# Patient Record
Sex: MALE | Race: WHITE | NOT HISPANIC OR LATINO
[De-identification: names, ages, dates, MRNs, and addresses within clinical notes are randomized per-mention and may not be internally consistent; named-entity substitution may affect disease eponyms.]

---

## 2017-02-14 ENCOUNTER — APPOINTMENT (OUTPATIENT)
Dept: GASTROENTEROLOGY | Facility: CLINIC | Age: 82
End: 2017-02-14

## 2017-02-14 VITALS
OXYGEN SATURATION: 98 % | RESPIRATION RATE: 16 BRPM | HEART RATE: 79 BPM | WEIGHT: 143 LBS | DIASTOLIC BLOOD PRESSURE: 85 MMHG | HEIGHT: 70 IN | TEMPERATURE: 97.5 F | SYSTOLIC BLOOD PRESSURE: 151 MMHG | BODY MASS INDEX: 20.47 KG/M2

## 2017-03-15 ENCOUNTER — APPOINTMENT (OUTPATIENT)
Dept: MRI IMAGING | Facility: IMAGING CENTER | Age: 82
End: 2017-03-15

## 2017-04-22 ENCOUNTER — OUTPATIENT (OUTPATIENT)
Dept: OUTPATIENT SERVICES | Facility: HOSPITAL | Age: 82
LOS: 1 days | End: 2017-04-22
Payer: MEDICARE

## 2017-04-22 ENCOUNTER — APPOINTMENT (OUTPATIENT)
Dept: MRI IMAGING | Facility: IMAGING CENTER | Age: 82
End: 2017-04-22

## 2017-04-22 DIAGNOSIS — D49.0 NEOPLASM OF UNSPECIFIED BEHAVIOR OF DIGESTIVE SYSTEM: ICD-10-CM

## 2017-04-22 DIAGNOSIS — Z96.649 PRESENCE OF UNSPECIFIED ARTIFICIAL HIP JOINT: Chronic | ICD-10-CM

## 2017-04-22 PROCEDURE — 82565 ASSAY OF CREATININE: CPT

## 2017-04-22 PROCEDURE — 74183 MRI ABD W/O CNTR FLWD CNTR: CPT

## 2017-04-22 PROCEDURE — A9585: CPT

## 2017-05-03 ENCOUNTER — MESSAGE (OUTPATIENT)
Age: 82
End: 2017-05-03

## 2017-06-05 ENCOUNTER — APPOINTMENT (OUTPATIENT)
Dept: UROLOGY | Facility: CLINIC | Age: 82
End: 2017-06-05

## 2017-06-05 VITALS
RESPIRATION RATE: 16 BRPM | SYSTOLIC BLOOD PRESSURE: 132 MMHG | HEIGHT: 70 IN | TEMPERATURE: 98.3 F | BODY MASS INDEX: 19.76 KG/M2 | HEART RATE: 85 BPM | WEIGHT: 138 LBS | DIASTOLIC BLOOD PRESSURE: 86 MMHG

## 2017-06-05 RX ORDER — FINASTERIDE 5 MG/1
5 TABLET, FILM COATED ORAL
Refills: 0 | Status: COMPLETED | COMMUNITY

## 2017-06-09 LAB
APPEARANCE: CLEAR
BACTERIA UR CULT: ABNORMAL
BACTERIA: NEGATIVE
BILIRUBIN URINE: NEGATIVE
BLOOD URINE: ABNORMAL
COLOR: YELLOW
GLUCOSE QUALITATIVE U: NORMAL MG/DL
HYALINE CASTS: 2 /LPF
KETONES URINE: NEGATIVE
LEUKOCYTE ESTERASE URINE: ABNORMAL
MICROSCOPIC-UA: NORMAL
NITRITE URINE: POSITIVE
PH URINE: 5.5
PROTEIN URINE: 30 MG/DL
RED BLOOD CELLS URINE: 3 /HPF
SPECIFIC GRAVITY URINE: 1.02
SQUAMOUS EPITHELIAL CELLS: 1 /HPF
URINE COMMENTS: NORMAL
UROBILINOGEN URINE: NORMAL MG/DL
WHITE BLOOD CELLS URINE: 38 /HPF

## 2017-07-06 ENCOUNTER — APPOINTMENT (OUTPATIENT)
Dept: UROLOGY | Facility: CLINIC | Age: 82
End: 2017-07-06

## 2017-10-02 ENCOUNTER — APPOINTMENT (OUTPATIENT)
Dept: UROLOGY | Facility: CLINIC | Age: 82
End: 2017-10-02
Payer: MEDICARE

## 2017-10-02 VITALS
TEMPERATURE: 97.3 F | HEART RATE: 75 BPM | WEIGHT: 136.38 LBS | RESPIRATION RATE: 16 BRPM | SYSTOLIC BLOOD PRESSURE: 130 MMHG | HEIGHT: 70 IN | BODY MASS INDEX: 19.52 KG/M2 | DIASTOLIC BLOOD PRESSURE: 76 MMHG

## 2017-10-02 PROCEDURE — 99214 OFFICE O/P EST MOD 30 MIN: CPT

## 2017-10-03 RX ORDER — OXYBUTYNIN CHLORIDE 5 MG/1
5 TABLET ORAL AT BEDTIME
Qty: 30 | Refills: 3 | Status: COMPLETED | COMMUNITY
Start: 2017-06-05 | End: 2017-10-03

## 2017-10-03 RX ORDER — SULFAMETHOXAZOLE AND TRIMETHOPRIM 800; 160 MG/1; MG/1
800-160 TABLET ORAL
Qty: 14 | Refills: 0 | Status: COMPLETED | COMMUNITY
Start: 2017-06-08 | End: 2017-10-03

## 2017-10-04 RX ORDER — TOLTERODINE TARTRATE 2 MG/1
2 CAPSULE, EXTENDED RELEASE ORAL DAILY
Qty: 90 | Refills: 3 | Status: DISCONTINUED | COMMUNITY
Start: 2017-10-04 | End: 2017-10-04

## 2017-10-05 LAB
APPEARANCE: CLEAR
BACTERIA UR CULT: ABNORMAL
BACTERIA: ABNORMAL
BILIRUBIN URINE: NEGATIVE
BLOOD URINE: ABNORMAL
COLOR: YELLOW
GLUCOSE QUALITATIVE U: NORMAL MG/DL
HYALINE CASTS: 0 /LPF
KETONES URINE: NEGATIVE
LEUKOCYTE ESTERASE URINE: ABNORMAL
MICROSCOPIC-UA: NORMAL
NITRITE URINE: POSITIVE
PH URINE: 5.5
PROTEIN URINE: ABNORMAL MG/DL
RED BLOOD CELLS URINE: 16 /HPF
SPECIFIC GRAVITY URINE: 1.02
SQUAMOUS EPITHELIAL CELLS: 4 /HPF
URINE COMMENTS: NORMAL
UROBILINOGEN URINE: NORMAL MG/DL
WHITE BLOOD CELLS URINE: 34 /HPF

## 2017-10-13 ENCOUNTER — OUTPATIENT (OUTPATIENT)
Dept: OUTPATIENT SERVICES | Facility: HOSPITAL | Age: 82
LOS: 1 days | End: 2017-10-13
Payer: MEDICARE

## 2017-10-13 ENCOUNTER — APPOINTMENT (OUTPATIENT)
Dept: ULTRASOUND IMAGING | Facility: HOSPITAL | Age: 82
End: 2017-10-13
Payer: MEDICARE

## 2017-10-13 DIAGNOSIS — N39.0 URINARY TRACT INFECTION, SITE NOT SPECIFIED: ICD-10-CM

## 2017-10-13 DIAGNOSIS — Z96.649 PRESENCE OF UNSPECIFIED ARTIFICIAL HIP JOINT: Chronic | ICD-10-CM

## 2017-10-13 DIAGNOSIS — R39.9 UNSPECIFIED SYMPTOMS AND SIGNS INVOLVING THE GENITOURINARY SYSTEM: ICD-10-CM

## 2017-10-13 PROCEDURE — 76775 US EXAM ABDO BACK WALL LIM: CPT

## 2017-10-13 PROCEDURE — 76857 US EXAM PELVIC LIMITED: CPT

## 2017-10-13 PROCEDURE — 76770 US EXAM ABDO BACK WALL COMP: CPT | Mod: 26

## 2017-10-26 ENCOUNTER — APPOINTMENT (OUTPATIENT)
Dept: UROLOGY | Facility: CLINIC | Age: 82
End: 2017-10-26
Payer: MEDICARE

## 2017-10-26 VITALS
HEIGHT: 70 IN | WEIGHT: 140 LBS | SYSTOLIC BLOOD PRESSURE: 118 MMHG | HEART RATE: 70 BPM | BODY MASS INDEX: 20.04 KG/M2 | DIASTOLIC BLOOD PRESSURE: 78 MMHG | TEMPERATURE: 97.9 F | RESPIRATION RATE: 16 BRPM | OXYGEN SATURATION: 97 %

## 2017-10-26 PROCEDURE — 99215 OFFICE O/P EST HI 40 MIN: CPT

## 2017-10-26 PROCEDURE — 51798 US URINE CAPACITY MEASURE: CPT

## 2017-10-26 RX ORDER — SOLIFENACIN SUCCINATE 5 MG/1
5 TABLET, FILM COATED ORAL
Qty: 90 | Refills: 3 | Status: DISCONTINUED | COMMUNITY
Start: 2017-10-03 | End: 2017-10-26

## 2017-11-06 ENCOUNTER — APPOINTMENT (OUTPATIENT)
Dept: UROLOGY | Facility: CLINIC | Age: 82
End: 2017-11-06
Payer: MEDICARE

## 2017-11-06 VITALS — HEART RATE: 86 BPM | DIASTOLIC BLOOD PRESSURE: 90 MMHG | TEMPERATURE: 97.8 F | SYSTOLIC BLOOD PRESSURE: 110 MMHG

## 2017-11-06 VITALS
WEIGHT: 135 LBS | DIASTOLIC BLOOD PRESSURE: 90 MMHG | HEIGHT: 70 IN | HEART RATE: 84 BPM | RESPIRATION RATE: 16 BRPM | SYSTOLIC BLOOD PRESSURE: 110 MMHG | BODY MASS INDEX: 19.33 KG/M2 | TEMPERATURE: 97.6 F

## 2017-11-06 DIAGNOSIS — N39.0 URINARY TRACT INFECTION, SITE NOT SPECIFIED: ICD-10-CM

## 2017-11-06 DIAGNOSIS — K59.00 CONSTIPATION, UNSPECIFIED: ICD-10-CM

## 2017-11-06 PROCEDURE — 52000 CYSTOURETHROSCOPY: CPT

## 2017-11-06 RX ORDER — FLUCONAZOLE 200 MG/1
200 TABLET ORAL DAILY
Qty: 7 | Refills: 0 | Status: COMPLETED | COMMUNITY
Start: 2017-10-26 | End: 2017-11-06

## 2017-11-06 RX ORDER — SULFAMETHOXAZOLE AND TRIMETHOPRIM 800; 160 MG/1; MG/1
800-160 TABLET ORAL
Qty: 6 | Refills: 0 | Status: COMPLETED | COMMUNITY
Start: 2017-10-26 | End: 2017-11-06

## 2017-11-06 RX ORDER — CIPROFLOXACIN HYDROCHLORIDE 500 MG/1
500 TABLET, FILM COATED ORAL
Refills: 0 | Status: COMPLETED | OUTPATIENT
Start: 2017-11-06

## 2017-11-06 RX ORDER — SULFAMETHOXAZOLE AND TRIMETHOPRIM 800; 160 MG/1; MG/1
800-160 TABLET ORAL
Qty: 14 | Refills: 0 | Status: COMPLETED | COMMUNITY
Start: 2017-10-05 | End: 2017-11-06

## 2017-11-06 RX ADMIN — CIPROFLOXACIN HYDROCHLORIDE 0 MG: 500 TABLET, FILM COATED ORAL at 00:00

## 2017-11-10 LAB
APPEARANCE: CLEAR
BACTERIA UR CULT: ABNORMAL
BACTERIA: ABNORMAL
BILIRUBIN URINE: NEGATIVE
BLOOD URINE: ABNORMAL
COLOR: YELLOW
GLUCOSE QUALITATIVE U: NEGATIVE MG/DL
HYALINE CASTS: 0 /LPF
KETONES URINE: NEGATIVE
LEUKOCYTE ESTERASE URINE: ABNORMAL
MICROSCOPIC-UA: NORMAL
NITRITE URINE: NEGATIVE
PH URINE: 5.5
PROTEIN URINE: NEGATIVE MG/DL
RED BLOOD CELLS URINE: 6 /HPF
SPECIFIC GRAVITY URINE: 1.01
SQUAMOUS EPITHELIAL CELLS: 0 /HPF
UROBILINOGEN URINE: NEGATIVE MG/DL
WHITE BLOOD CELLS URINE: 44 /HPF

## 2018-02-12 ENCOUNTER — APPOINTMENT (OUTPATIENT)
Dept: UROLOGY | Facility: CLINIC | Age: 83
End: 2018-02-12

## 2018-03-05 ENCOUNTER — APPOINTMENT (OUTPATIENT)
Dept: UROLOGY | Facility: CLINIC | Age: 83
End: 2018-03-05
Payer: MEDICARE

## 2018-03-05 DIAGNOSIS — R35.1 NOCTURIA: ICD-10-CM

## 2018-03-05 PROCEDURE — 51798 US URINE CAPACITY MEASURE: CPT

## 2018-03-05 PROCEDURE — 99213 OFFICE O/P EST LOW 20 MIN: CPT

## 2018-03-08 RX ORDER — ESCITALOPRAM OXALATE 10 MG/1
10 TABLET ORAL
Qty: 30 | Refills: 0 | Status: COMPLETED | COMMUNITY
Start: 2017-06-15

## 2018-03-08 RX ORDER — CIPROFLOXACIN HYDROCHLORIDE 500 MG/1
500 TABLET, FILM COATED ORAL
Qty: 2 | Refills: 0 | Status: COMPLETED | COMMUNITY
Start: 2017-11-06 | End: 2018-03-08

## 2018-03-08 RX ORDER — CLINDAMYCIN HYDROCHLORIDE 300 MG/1
300 CAPSULE ORAL EVERY 6 HOURS
Qty: 20 | Refills: 0 | Status: COMPLETED | COMMUNITY
Start: 2017-11-10 | End: 2018-03-08

## 2018-05-15 ENCOUNTER — APPOINTMENT (OUTPATIENT)
Dept: ORTHOPEDIC SURGERY | Facility: CLINIC | Age: 83
End: 2018-05-15

## 2018-06-28 ENCOUNTER — APPOINTMENT (OUTPATIENT)
Dept: GASTROENTEROLOGY | Facility: CLINIC | Age: 83
End: 2018-06-28

## 2018-09-17 ENCOUNTER — APPOINTMENT (OUTPATIENT)
Dept: UROLOGY | Facility: CLINIC | Age: 83
End: 2018-09-17

## 2020-08-05 ENCOUNTER — APPOINTMENT (OUTPATIENT)
Dept: PEDIATRIC MEDICAL GENETICS | Facility: CLINIC | Age: 85
End: 2020-08-05
Payer: MEDICARE

## 2020-08-05 PROCEDURE — ZZZZZ: CPT

## 2020-08-24 NOTE — REASON FOR VISIT
[Initial - Scheduled] : [unfilled]  is being seen for  ~M an initial scheduled visit [Home] : at home, [unfilled] , at the time of the visit. [Medical Office: ([facility])___] : at the medical office located in  [Verbal consent obtained from patient] : the patient, [unfilled] [FreeTextEntry3] : He is being seen due to a family history of a  pathogenic variant in PMP22.\par \par

## 2020-10-05 ENCOUNTER — APPOINTMENT (OUTPATIENT)
Dept: UROLOGY | Facility: CLINIC | Age: 85
End: 2020-10-05
Payer: MEDICARE

## 2020-10-08 ENCOUNTER — APPOINTMENT (OUTPATIENT)
Dept: UROLOGY | Facility: CLINIC | Age: 85
End: 2020-10-08
Payer: MEDICARE

## 2020-10-08 VITALS
HEIGHT: 70 IN | DIASTOLIC BLOOD PRESSURE: 85 MMHG | SYSTOLIC BLOOD PRESSURE: 147 MMHG | HEART RATE: 69 BPM | BODY MASS INDEX: 19.33 KG/M2 | WEIGHT: 135 LBS | TEMPERATURE: 98 F

## 2020-10-08 PROCEDURE — 99214 OFFICE O/P EST MOD 30 MIN: CPT

## 2020-10-11 RX ORDER — DOCUSATE SODIUM 100 MG/1
100 CAPSULE ORAL TWICE DAILY
Qty: 60 | Refills: 3 | Status: COMPLETED | COMMUNITY
Start: 2017-10-26 | End: 2020-10-11

## 2020-10-11 RX ORDER — UBIDECARENONE/VIT E ACET 100MG-5
50 MCG CAPSULE ORAL
Refills: 0 | Status: COMPLETED | COMMUNITY
End: 2020-10-11

## 2020-10-11 RX ORDER — TAMSULOSIN HYDROCHLORIDE 0.4 MG/1
0.4 CAPSULE ORAL
Qty: 90 | Refills: 3 | Status: COMPLETED | COMMUNITY
Start: 2017-10-03 | End: 2020-10-11

## 2020-10-11 RX ORDER — FINASTERIDE 5 MG/1
5 TABLET, FILM COATED ORAL
Qty: 90 | Refills: 3 | Status: COMPLETED | COMMUNITY
Start: 2017-11-06 | End: 2020-10-11

## 2020-10-11 RX ORDER — PNV NO.95/FERROUS FUM/FOLIC AC 28MG-0.8MG
TABLET ORAL
Refills: 0 | Status: COMPLETED | COMMUNITY
End: 2020-10-11

## 2020-10-11 RX ORDER — EUCALYPTUS OIL/MENTHOL/CAMPHOR 1.2%-4.8%
1000 OINTMENT (GRAM) TOPICAL
Refills: 0 | Status: COMPLETED | COMMUNITY
End: 2020-10-11

## 2020-10-11 NOTE — HISTORY OF PRESENT ILLNESS
[FreeTextEntry1] : 80 yo M presents with a many year history of nocturia that has progressively gotten worse - q1-2 hours to the point where he is having difficulty getting sleep. Does have history of urinary retention after a colonoscopy in 2015 due to BPH and underwent TURP by outside urologist. No change in symptoms after surgery. Has also been taking finasteride for many years. Denies any urinary straining, incomplete voiding, urgency, incontinence, gross hematuria, dysuria. Normal bowel movements. Most bothersome is his nocturia. Per family member, patient snores and son is worried about possible sleep apnea. \par \par Pt was put on trial of vesicare but also had recent hip dislocation and ended up going into urinary retention. Stopped all anticholinergics. LUTS has not improved at all. Has continued to refuse to go for sleep study. \par \par 10/8/20 interval history: Pt hasn't been seen since March, 2018\par Per pt, he had been doing well from urinary standpoint.\par Still having some nocturia but not as bothersome\par Currently on no medications\par However, noticed some hematuria a few weeks ago - lasted 1 day\par no dysuria, no fever, chills\par no history of UTI recently\par Drinks 2-3 glasses of water, sometimes milk\par Last PCP check-up was in Dec, per son, PSA was normal at that time\par

## 2020-10-11 NOTE — ASSESSMENT
[FreeTextEntry1] : 84 yo M with history of LUTS, recent gross hematuria\par \par - PVR = 60ml\par - UA, culture, cytology\par - Discussed possible etiologies for hematuria including benign (UTI, nephrolithiasis, cyst, BPH) vs malignancy (renal, ureteral and bladder). Discussed hematuria workup which includes CT urogram and cystoscopy. Discussed risk and benefits of cystoscopy.\par - Will hold off on cysto until CT urogram evaluated\par

## 2020-10-11 NOTE — PHYSICAL EXAM
[General Appearance - Well Developed] : well developed [General Appearance - Well Nourished] : well nourished [Well Groomed] : well groomed [General Appearance - In No Acute Distress] : no acute distress [Abdomen Soft] : soft [Abdomen Tenderness] : non-tender [Costovertebral Angle Tenderness] : no ~M costovertebral angle tenderness [Urethral Meatus] : meatus normal [Penis Abnormality] : normal uncircumcised penis [Urinary Bladder Findings] : the bladder was normal on palpation [Scrotum] : the scrotum was normal [Epididymis] : the epididymides were normal [Testes Tenderness] : no tenderness of the testes [Testes Mass (___cm)] : there were no testicular masses [Anus Abnormality] : the anus and perineum were normal [Rectal Exam - Rectum] : digital rectal exam was normal [Prostate Tenderness] : the prostate was not tender [No Prostate Nodules] : no prostate nodules [Prostate Size ___ gm] : prostate size [unfilled] gm [Edema] : no peripheral edema [] : no respiratory distress [Respiration, Rhythm And Depth] : normal respiratory rhythm and effort [Exaggerated Use Of Accessory Muscles For Inspiration] : no accessory muscle use [Oriented To Time, Place, And Person] : oriented to person, place, and time [Affect] : the affect was normal [Mood] : the mood was normal [Not Anxious] : not anxious [No Focal Deficits] : no focal deficits [No Palpable Adenopathy] : no palpable adenopathy [FreeTextEntry1] : left leg in brace

## 2020-10-13 LAB
ALBUMIN SERPL ELPH-MCNC: 4.3 G/DL
ALP BLD-CCNC: 94 U/L
ALT SERPL-CCNC: 17 U/L
ANION GAP SERPL CALC-SCNC: 13 MMOL/L
APPEARANCE: ABNORMAL
AST SERPL-CCNC: 19 U/L
BACTERIA UR CULT: ABNORMAL
BACTERIA: ABNORMAL
BASOPHILS # BLD AUTO: 0.03 K/UL
BASOPHILS NFR BLD AUTO: 0.4 %
BILIRUB SERPL-MCNC: 0.4 MG/DL
BILIRUBIN URINE: NEGATIVE
BLOOD URINE: NEGATIVE
BUN SERPL-MCNC: 26 MG/DL
CALCIUM SERPL-MCNC: 10.5 MG/DL
CHLORIDE SERPL-SCNC: 104 MMOL/L
CO2 SERPL-SCNC: 23 MMOL/L
COLOR: YELLOW
CREAT SERPL-MCNC: 0.91 MG/DL
EOSINOPHIL # BLD AUTO: 0.12 K/UL
EOSINOPHIL NFR BLD AUTO: 1.5 %
GLUCOSE QUALITATIVE U: NEGATIVE
GLUCOSE SERPL-MCNC: 103 MG/DL
HCT VFR BLD CALC: 46.2 %
HGB BLD-MCNC: 14.9 G/DL
HYALINE CASTS: 2 /LPF
IMM GRANULOCYTES NFR BLD AUTO: 0.2 %
KETONES URINE: NEGATIVE
LEUKOCYTE ESTERASE URINE: ABNORMAL
LYMPHOCYTES # BLD AUTO: 1.21 K/UL
LYMPHOCYTES NFR BLD AUTO: 14.9 %
MAN DIFF?: NORMAL
MCHC RBC-ENTMCNC: 30.7 PG
MCHC RBC-ENTMCNC: 32.3 GM/DL
MCV RBC AUTO: 95.1 FL
MICROSCOPIC-UA: NORMAL
MONOCYTES # BLD AUTO: 0.66 K/UL
MONOCYTES NFR BLD AUTO: 8.1 %
NEUTROPHILS # BLD AUTO: 6.07 K/UL
NEUTROPHILS NFR BLD AUTO: 74.9 %
NITRITE URINE: NEGATIVE
PH URINE: 6.5
PLATELET # BLD AUTO: 181 K/UL
POTASSIUM SERPL-SCNC: 4.6 MMOL/L
PROT SERPL-MCNC: 6.6 G/DL
PROTEIN URINE: ABNORMAL
RBC # BLD: 4.86 M/UL
RBC # FLD: 13.2 %
RED BLOOD CELLS URINE: 3 /HPF
SODIUM SERPL-SCNC: 139 MMOL/L
SPECIFIC GRAVITY URINE: 1.02
SQUAMOUS EPITHELIAL CELLS: 3 /HPF
UROBILINOGEN URINE: NORMAL
WBC # FLD AUTO: 8.11 K/UL
WHITE BLOOD CELLS URINE: 145 /HPF

## 2020-11-09 ENCOUNTER — APPOINTMENT (OUTPATIENT)
Dept: OTOLARYNGOLOGY | Facility: CLINIC | Age: 85
End: 2020-11-09
Payer: MEDICARE

## 2020-11-09 VITALS
TEMPERATURE: 98.4 F | HEIGHT: 70 IN | BODY MASS INDEX: 19.33 KG/M2 | WEIGHT: 135 LBS | DIASTOLIC BLOOD PRESSURE: 75 MMHG | SYSTOLIC BLOOD PRESSURE: 138 MMHG | HEART RATE: 59 BPM

## 2020-11-09 DIAGNOSIS — H90.3 SENSORINEURAL HEARING LOSS, BILATERAL: ICD-10-CM

## 2020-11-09 DIAGNOSIS — H61.23 IMPACTED CERUMEN, BILATERAL: ICD-10-CM

## 2020-11-09 PROCEDURE — 99204 OFFICE O/P NEW MOD 45 MIN: CPT | Mod: 25

## 2020-11-09 PROCEDURE — 69210 REMOVE IMPACTED EAR WAX UNI: CPT

## 2020-11-09 PROCEDURE — 99072 ADDL SUPL MATRL&STAF TM PHE: CPT

## 2020-11-09 NOTE — ASSESSMENT
[FreeTextEntry1] : Patient complaining of diminished hearing he is a  massive cerumen impaction 90% of it removed told his son who is with him to use to have him use Debrox to get rid of the rest and told him to [facility] for [facility] test and possible hearing aids since it is a covered benefit.

## 2020-11-09 NOTE — HISTORY OF PRESENT ILLNESS
[de-identified] : Patient has been having sudden worsening of hearing over the last month or so. He does not complain of pain or itching in the eras and has never worn hearing aids. He does not have any nasal complaints today such as congestion or runny nose. HE did go for an hear cleaning at another ENT many years ago and was advised he had a lot of accumulation in both ears.

## 2020-12-15 PROBLEM — N39.0 ACUTE LOWER UTI: Status: RESOLVED | Noted: 2017-06-08 | Resolved: 2020-12-15

## 2021-03-08 ENCOUNTER — TRANSCRIPTION ENCOUNTER (OUTPATIENT)
Age: 86
End: 2021-03-08

## 2021-03-08 ENCOUNTER — APPOINTMENT (OUTPATIENT)
Dept: UROLOGY | Facility: CLINIC | Age: 86
End: 2021-03-08
Payer: MEDICARE

## 2021-03-08 PROCEDURE — 99214 OFFICE O/P EST MOD 30 MIN: CPT

## 2021-03-08 PROCEDURE — 99072 ADDL SUPL MATRL&STAF TM PHE: CPT

## 2021-03-14 NOTE — PHYSICAL EXAM
[General Appearance - Well Developed] : well developed [General Appearance - Well Nourished] : well nourished [Well Groomed] : well groomed [General Appearance - In No Acute Distress] : no acute distress [Abdomen Soft] : soft [Abdomen Tenderness] : non-tender [Costovertebral Angle Tenderness] : no ~M costovertebral angle tenderness [Urethral Meatus] : meatus normal [Penis Abnormality] : normal uncircumcised penis [Urinary Bladder Findings] : the bladder was normal on palpation [Scrotum] : the scrotum was normal [Epididymis] : the epididymides were normal [Testes Tenderness] : no tenderness of the testes [Testes Mass (___cm)] : there were no testicular masses [Anus Abnormality] : the anus and perineum were normal [Rectal Exam - Rectum] : digital rectal exam was normal [Prostate Tenderness] : the prostate was not tender [No Prostate Nodules] : no prostate nodules [Prostate Size ___ gm] : prostate size [unfilled] gm [Edema] : no peripheral edema [] : no respiratory distress [Respiration, Rhythm And Depth] : normal respiratory rhythm and effort [Exaggerated Use Of Accessory Muscles For Inspiration] : no accessory muscle use [Oriented To Time, Place, And Person] : oriented to person, place, and time [Affect] : the affect was normal [Mood] : the mood was normal [Not Anxious] : not anxious [FreeTextEntry1] : left leg in brace [No Focal Deficits] : no focal deficits [No Palpable Adenopathy] : no palpable adenopathy

## 2021-03-14 NOTE — HISTORY OF PRESENT ILLNESS
[FreeTextEntry1] : 80 yo M presents with a many year history of nocturia that has progressively gotten worse - q1-2 hours to the point where he is having difficulty getting sleep. Does have history of urinary retention after a colonoscopy in 2015 due to BPH and underwent TURP by outside urologist. No change in symptoms after surgery. Has also been taking finasteride for many years. Denies any urinary straining, incomplete voiding, urgency, incontinence, gross hematuria, dysuria. Normal bowel movements. Most bothersome is his nocturia. Per family member, patient snores and son is worried about possible sleep apnea. \par \par Pt was put on trial of vesicare but also had recent hip dislocation and ended up going into urinary retention. Stopped all anticholinergics. LUTS has not improved at all. Has continued to refuse to go for sleep study. \par \par 10/8/20 interval history: Pt hasn't been seen since March, 2018\par Per pt, he had been doing well from urinary standpoint.\par Still having some nocturia but not as bothersome\par Currently on no medications\par However, noticed some hematuria a few weeks ago - lasted 1 day\par no dysuria, no fever, chills\par no history of UTI recently\par Drinks 2-3 glasses of water, sometimes milk\par Last PCP check-up was in Dec, per son, PSA was normal at that time\par \par 3/8/21 Interval history: gross hematuria yesterday - red urine\par currently yellow urine\par increase in LUTS overnight\par No fever, chills\par Never ended up getting his CT urogram after last visit

## 2021-03-14 NOTE — ASSESSMENT
[FreeTextEntry1] : 84 yo M with BPH, LUTS, gross hematuria\par \par - Reviewed previous UA which showed microhematuria, pyuria as well as culture which showed Group B Strep\par - UA, culture\par - Start Bactrim Rx\par - Given intermittent hematuria, emphasized importance of getting his CT urogram and ultimately may need cysto as well. Pt and son agreed that they would obtain CT urogram asap

## 2021-03-15 ENCOUNTER — NON-APPOINTMENT (OUTPATIENT)
Age: 86
End: 2021-03-15

## 2021-03-15 LAB
APPEARANCE: ABNORMAL
BACTERIA UR CULT: ABNORMAL
BACTERIA: ABNORMAL
BILIRUBIN URINE: NEGATIVE
BLOOD URINE: ABNORMAL
COLOR: YELLOW
GLUCOSE QUALITATIVE U: NEGATIVE
HYALINE CASTS: 0 /LPF
KETONES URINE: NEGATIVE
LEUKOCYTE ESTERASE URINE: ABNORMAL
MICROSCOPIC-UA: NORMAL
NITRITE URINE: NEGATIVE
PH URINE: 6
PROTEIN URINE: ABNORMAL
RED BLOOD CELLS URINE: 1 /HPF
SPECIFIC GRAVITY URINE: 1.01
SQUAMOUS EPITHELIAL CELLS: 1 /HPF
UROBILINOGEN URINE: NORMAL
WHITE BLOOD CELLS URINE: 102 /HPF

## 2021-03-23 ENCOUNTER — APPOINTMENT (OUTPATIENT)
Dept: GASTROENTEROLOGY | Facility: CLINIC | Age: 86
End: 2021-03-23
Payer: MEDICARE

## 2021-03-23 DIAGNOSIS — D49.0 NEOPLASM OF UNSPECIFIED BEHAVIOR OF DIGESTIVE SYSTEM: ICD-10-CM

## 2021-03-23 PROCEDURE — 99442: CPT

## 2021-03-28 NOTE — HISTORY OF PRESENT ILLNESS
[Home] : at home, [unfilled] , at the time of the visit. 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 His weight is stable. [Time Spent: ___ minutes] : I have spent [unfilled] minutes with the patient on the telephone

## 2021-03-28 NOTE — PLAN
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 Branch duct duct component measures 20 x 23 mm by April 2016 EUS/FNA, with no significant changes on October 2016 MRI.\par Family history of pancreatic cancer.\par \par Recommendations:\par \par Patient and son are interested in surveillance.\par Ordered laboratory tests as below.\par MRI/MRCP with and without contrast ordered.\par They will call for results.

## 2021-03-29 ENCOUNTER — APPOINTMENT (OUTPATIENT)
Dept: OTOLARYNGOLOGY | Facility: CLINIC | Age: 86
End: 2021-03-29

## 2021-03-31 ENCOUNTER — NON-APPOINTMENT (OUTPATIENT)
Age: 86
End: 2021-03-31

## 2021-09-13 ENCOUNTER — APPOINTMENT (OUTPATIENT)
Age: 86
End: 2021-09-13
Payer: MEDICARE

## 2021-09-13 PROCEDURE — 99214 OFFICE O/P EST MOD 30 MIN: CPT

## 2021-09-16 LAB
ANION GAP SERPL CALC-SCNC: 9 MMOL/L
BASOPHILS # BLD AUTO: 0.03 K/UL
BASOPHILS NFR BLD AUTO: 0.4 %
BUN SERPL-MCNC: 20 MG/DL
CALCIUM SERPL-MCNC: 10.4 MG/DL
CHLORIDE SERPL-SCNC: 106 MMOL/L
CO2 SERPL-SCNC: 27 MMOL/L
CREAT SERPL-MCNC: 0.94 MG/DL
EOSINOPHIL # BLD AUTO: 0.14 K/UL
EOSINOPHIL NFR BLD AUTO: 2.1 %
GLUCOSE SERPL-MCNC: 94 MG/DL
HCT VFR BLD CALC: 49.8 %
HGB BLD-MCNC: 15.5 G/DL
IMM GRANULOCYTES NFR BLD AUTO: 0.1 %
LYMPHOCYTES # BLD AUTO: 1.43 K/UL
LYMPHOCYTES NFR BLD AUTO: 21.1 %
MAN DIFF?: NORMAL
MCHC RBC-ENTMCNC: 29.9 PG
MCHC RBC-ENTMCNC: 31.1 GM/DL
MCV RBC AUTO: 96.1 FL
MONOCYTES # BLD AUTO: 0.61 K/UL
MONOCYTES NFR BLD AUTO: 9 %
NEUTROPHILS # BLD AUTO: 4.55 K/UL
NEUTROPHILS NFR BLD AUTO: 67.3 %
PLATELET # BLD AUTO: 156 K/UL
POTASSIUM SERPL-SCNC: 4.7 MMOL/L
RBC # BLD: 5.18 M/UL
RBC # FLD: 13.5 %
SODIUM SERPL-SCNC: 142 MMOL/L
WBC # FLD AUTO: 6.77 K/UL

## 2021-09-19 NOTE — HISTORY OF PRESENT ILLNESS
[FreeTextEntry1] : 82 yo M presents with a many year history of nocturia that has progressively gotten worse - q1-2 hours to the point where he is having difficulty getting sleep. Does have history of urinary retention after a colonoscopy in 2015 due to BPH and underwent TURP by outside urologist. No change in symptoms after surgery. Has also been taking finasteride for many years. Denies any urinary straining, incomplete voiding, urgency, incontinence, gross hematuria, dysuria. Normal bowel movements. Most bothersome is his nocturia. Per family member, patient snores and son is worried about possible sleep apnea. \par \par Pt was put on trial of vesicare but also had recent hip dislocation and ended up going into urinary retention. Stopped all anticholinergics. LUTS has not improved at all. Has continued to refuse to go for sleep study. \par \par 10/8/20 interval history: Pt hasn't been seen since March, 2018\par Per pt, he had been doing well from urinary standpoint.\par Still having some nocturia but not as bothersome\par Currently on no medications\par However, noticed some hematuria a few weeks ago - lasted 1 day\par no dysuria, no fever, chills\par no history of UTI recently\par Drinks 2-3 glasses of water, sometimes milk\par Last PCP check-up was in Dec, per son, PSA was normal at that time\par \par 3/8/21 Interval history: gross hematuria yesterday - red urine\par currently yellow urine\par increase in LUTS overnight\par No fever, chills\par Never ended up getting his CT urogram after last visit\par \par 9/13/21 Interval history: Doing well since last visit with no significant complaints\par CT done after last visit showed some bilateral nephroliths and simple renal cysts\par Also showed severe dilatation of the pancreatic duct which was previously evaluated by GI, per pt's son\par UA at last visit did show bacteruria with pyuria but no hematuria\par Still hasn't really established care with a PCP since his previous one passed away

## 2021-09-19 NOTE — ASSESSMENT
[FreeTextEntry1] : 84 yo M with BPH, LUTS, recurrent UTI, nephrolithiasis\par \par - Reviewed CT from March through [facility] portal and discussed findings with pt\par - Given stones are nonobstructing, will continue observation at this time\par - Discussed possible etiologies for nephrolithiasis. Reviewed behavioral modifications including adequate hydration, cutting back on coffee, dark sodas, dark teas, low sodium diet, increasing citrate levels with lemon juice. \par - Discussed importance of seeking medical attention should intractable flank pain with nausea, vomiting or fever occur\par - discussed possible etiologies for UTI. Spent extensive period of time discussed behavioral modification including adequate hydration, cutting back on caffeine intake, timed voiding during the day, importance of controlling any diabetes and chronic constipation and how all of these things could potentially increase risk for persistent or recurrent UTI.\par - Encouraged pt to establish care with a PCP. Will draw routine CBC and BMP today\par - FU in 6 months

## 2021-09-19 NOTE — PHYSICAL EXAM
[General Appearance - Well Nourished] : well nourished [General Appearance - Well Developed] : well developed [Well Groomed] : well groomed [General Appearance - In No Acute Distress] : no acute distress [Abdomen Tenderness] : non-tender [Abdomen Soft] : soft [Costovertebral Angle Tenderness] : no ~M costovertebral angle tenderness [Urethral Meatus] : meatus normal [Penis Abnormality] : normal uncircumcised penis [Urinary Bladder Findings] : the bladder was normal on palpation [Scrotum] : the scrotum was normal [Epididymis] : the epididymides were normal [Testes Tenderness] : no tenderness of the testes [Testes Mass (___cm)] : there were no testicular masses [Anus Abnormality] : the anus and perineum were normal [Rectal Exam - Rectum] : digital rectal exam was normal [Prostate Tenderness] : the prostate was not tender [No Prostate Nodules] : no prostate nodules [Prostate Size ___ gm] : prostate size [unfilled] gm [Edema] : no peripheral edema [] : no respiratory distress [Exaggerated Use Of Accessory Muscles For Inspiration] : no accessory muscle use [Respiration, Rhythm And Depth] : normal respiratory rhythm and effort [Oriented To Time, Place, And Person] : oriented to person, place, and time [Affect] : the affect was normal [Mood] : the mood was normal [FreeTextEntry1] : left leg in brace [Not Anxious] : not anxious [No Focal Deficits] : no focal deficits [No Palpable Adenopathy] : no palpable adenopathy

## 2022-02-07 ENCOUNTER — APPOINTMENT (OUTPATIENT)
Dept: UROLOGY | Facility: CLINIC | Age: 87
End: 2022-02-07

## 2022-04-21 ENCOUNTER — APPOINTMENT (OUTPATIENT)
Dept: ENDOCRINOLOGY | Facility: CLINIC | Age: 87
End: 2022-04-21

## 2022-04-21 ENCOUNTER — APPOINTMENT (OUTPATIENT)
Dept: UROLOGY | Facility: CLINIC | Age: 87
End: 2022-04-21

## 2022-10-20 ENCOUNTER — APPOINTMENT (OUTPATIENT)
Dept: OTOLARYNGOLOGY | Facility: CLINIC | Age: 87
End: 2022-10-20

## 2023-02-27 ENCOUNTER — APPOINTMENT (OUTPATIENT)
Age: 88
End: 2023-02-27
Payer: MEDICARE

## 2023-02-27 VITALS
TEMPERATURE: 96.5 F | SYSTOLIC BLOOD PRESSURE: 130 MMHG | HEIGHT: 70 IN | BODY MASS INDEX: 45.1 KG/M2 | OXYGEN SATURATION: 94 % | DIASTOLIC BLOOD PRESSURE: 75 MMHG | WEIGHT: 315 LBS | HEART RATE: 62 BPM

## 2023-02-27 DIAGNOSIS — N20.0 CALCULUS OF KIDNEY: ICD-10-CM

## 2023-02-27 DIAGNOSIS — N39.0 URINARY TRACT INFECTION, SITE NOT SPECIFIED: ICD-10-CM

## 2023-02-27 DIAGNOSIS — R39.9 UNSPECIFIED SYMPTOMS AND SIGNS INVOLVING THE GENITOURINARY SYSTEM: ICD-10-CM

## 2023-02-27 PROCEDURE — 99214 OFFICE O/P EST MOD 30 MIN: CPT

## 2023-02-27 PROCEDURE — 76775 US EXAM ABDO BACK WALL LIM: CPT

## 2023-02-27 RX ORDER — TAMSULOSIN HYDROCHLORIDE 0.4 MG/1
0.4 CAPSULE ORAL
Qty: 90 | Refills: 3 | Status: ACTIVE | COMMUNITY
Start: 2023-02-27 | End: 1900-01-01

## 2023-02-27 RX ORDER — SULFAMETHOXAZOLE AND TRIMETHOPRIM 800; 160 MG/1; MG/1
800-160 TABLET ORAL
Qty: 10 | Refills: 0 | Status: COMPLETED | COMMUNITY
Start: 2020-10-13 | End: 2023-02-27

## 2023-02-27 RX ORDER — SULFAMETHOXAZOLE AND TRIMETHOPRIM 800; 160 MG/1; MG/1
800-160 TABLET ORAL TWICE DAILY
Qty: 14 | Refills: 0 | Status: ACTIVE | COMMUNITY
Start: 2023-02-27 | End: 1900-01-01

## 2023-02-27 NOTE — REASON FOR VISIT
[Follow-up Visit ___] : a follow-up visit  for [unfilled] [Formal Caregiver] : formal caregiver [Family Member] : family member

## 2023-03-02 LAB
ANION GAP SERPL CALC-SCNC: 11 MMOL/L
APPEARANCE: ABNORMAL
BACTERIA UR CULT: NORMAL
BACTERIA: ABNORMAL
BASOPHILS # BLD AUTO: 0.05 K/UL
BASOPHILS NFR BLD AUTO: 0.6 %
BILIRUBIN URINE: NEGATIVE
BLOOD URINE: ABNORMAL
BUN SERPL-MCNC: 36 MG/DL
CALCIUM SERPL-MCNC: 10.7 MG/DL
CHLORIDE SERPL-SCNC: 105 MMOL/L
CO2 SERPL-SCNC: 25 MMOL/L
COLOR: NORMAL
CREAT SERPL-MCNC: 0.83 MG/DL
EGFR: 85 ML/MIN/1.73M2
EOSINOPHIL # BLD AUTO: 0.16 K/UL
EOSINOPHIL NFR BLD AUTO: 2 %
GLUCOSE QUALITATIVE U: NEGATIVE
GLUCOSE SERPL-MCNC: 102 MG/DL
HCT VFR BLD CALC: 45.2 %
HGB BLD-MCNC: 14.5 G/DL
HYALINE CASTS: 0 /LPF
IMM GRANULOCYTES NFR BLD AUTO: 0.4 %
KETONES URINE: NEGATIVE
LEUKOCYTE ESTERASE URINE: ABNORMAL
LYMPHOCYTES # BLD AUTO: 1.51 K/UL
LYMPHOCYTES NFR BLD AUTO: 18.5 %
MAN DIFF?: NORMAL
MCHC RBC-ENTMCNC: 30.9 PG
MCHC RBC-ENTMCNC: 32.1 GM/DL
MCV RBC AUTO: 96.2 FL
MICROSCOPIC-UA: NORMAL
MONOCYTES # BLD AUTO: 0.76 K/UL
MONOCYTES NFR BLD AUTO: 9.3 %
NEUTROPHILS # BLD AUTO: 5.67 K/UL
NEUTROPHILS NFR BLD AUTO: 69.2 %
NITRITE URINE: POSITIVE
PH URINE: 8
PLATELET # BLD AUTO: 225 K/UL
POTASSIUM SERPL-SCNC: 4.8 MMOL/L
PROTEIN URINE: ABNORMAL
RBC # BLD: 4.7 M/UL
RBC # FLD: 12.7 %
RED BLOOD CELLS URINE: 366 /HPF
SODIUM SERPL-SCNC: 141 MMOL/L
SPECIFIC GRAVITY URINE: 1.01
SQUAMOUS EPITHELIAL CELLS: 2 /HPF
TRIPLE PHOSPHATE CRYSTALS: ABNORMAL
UROBILINOGEN URINE: NORMAL
WBC # FLD AUTO: 8.18 K/UL
WHITE BLOOD CELLS URINE: 122 /HPF

## 2023-03-02 NOTE — ASSESSMENT
[FreeTextEntry1] : 86 yo M with history of recuurrent UTI, nephrolithiasis, LUTS, hematuria\par \par - PVR = 40ml\par - UA, culture\par - BMP, CBC\par - Start bactrim for possible UTI. May need to tailor based on sensitivities\par - Start tamsulosin for LUTS. Rx transmitted

## 2023-03-02 NOTE — HISTORY OF PRESENT ILLNESS
[FreeTextEntry1] : 82 yo M presents with a many year history of nocturia that has progressively gotten worse - q1-2 hours to the point where he is having difficulty getting sleep. Does have history of urinary retention after a colonoscopy in 2015 due to BPH and underwent TURP by outside urologist. No change in symptoms after surgery. Has also been taking finasteride for many years. Denies any urinary straining, incomplete voiding, urgency, incontinence, gross hematuria, dysuria. Normal bowel movements. Most bothersome is his nocturia. Per family member, patient snores and son is worried about possible sleep apnea. \par \par Pt was put on trial of vesicare but also had recent hip dislocation and ended up going into urinary retention. Stopped all anticholinergics. LUTS has not improved at all. Has continued to refuse to go for sleep study. \par \par 10/8/20 interval history: Pt hasn't been seen since March, 2018\par Per pt, he had been doing well from urinary standpoint.\par Still having some nocturia but not as bothersome\par Currently on no medications\par However, noticed some hematuria a few weeks ago - lasted 1 day\par no dysuria, no fever, chills\par no history of UTI recently\par Drinks 2-3 glasses of water, sometimes milk\par Last PCP check-up was in Dec, per son, PSA was normal at that time\par \par 3/8/21 Interval history: gross hematuria yesterday - red urine\par currently yellow urine\par increase in LUTS overnight\par No fever, chills\par Never ended up getting his CT urogram after last visit\par \par 9/13/21 Interval history: Doing well since last visit with no significant complaints\par CT done after last visit showed some bilateral nephroliths and simple renal cysts\par Also showed severe dilatation of the pancreatic duct which was previously evaluated by GI, per pt's son\par UA at last visit did show bacteruria with pyuria but no hematuria\par Still hasn't really established care with a PCP since his previous one passed away\par \par 2/27/23 Interval history: Hasn't been seen since 2021\par Since then, fractured his leg requiring long hospitalization and rehab\par Has been home since June 2022\par Did have a UTI at rehab requiring abx but none since coming home\par Presents with history of 1 month of worsening of LUTS and some hematuria\par nocturia - low volume voids, weak flow, sometimes red urine with clots and dysuria\par normal bowel movements\par Renal US today showed stable nonobstructing right stone, no hydronephrosis and bladder volume of 40ml\par

## 2023-03-02 NOTE — PHYSICAL EXAM
[General Appearance - Well Developed] : well developed [General Appearance - Well Nourished] : well nourished [Well Groomed] : well groomed [General Appearance - In No Acute Distress] : no acute distress [Abdomen Soft] : soft [Abdomen Tenderness] : non-tender [Costovertebral Angle Tenderness] : no ~M costovertebral angle tenderness [Urethral Meatus] : meatus normal [Penis Abnormality] : normal uncircumcised penis [Urinary Bladder Findings] : the bladder was normal on palpation [Scrotum] : the scrotum was normal [Epididymis] : the epididymides were normal [Testes Tenderness] : no tenderness of the testes [Testes Mass (___cm)] : there were no testicular masses [Anus Abnormality] : the anus and perineum were normal [Rectal Exam - Rectum] : digital rectal exam was normal [Prostate Tenderness] : the prostate was not tender [No Prostate Nodules] : no prostate nodules [Prostate Size ___ gm] : prostate size [unfilled] gm [Edema] : no peripheral edema [] : no respiratory distress [Respiration, Rhythm And Depth] : normal respiratory rhythm and effort [Exaggerated Use Of Accessory Muscles For Inspiration] : no accessory muscle use [Oriented To Time, Place, And Person] : oriented to person, place, and time [Affect] : the affect was normal [Mood] : the mood was normal [Not Anxious] : not anxious [No Focal Deficits] : no focal deficits [No Palpable Adenopathy] : no palpable adenopathy [FreeTextEntry1] : wheelchair bound

## 2023-03-06 ENCOUNTER — APPOINTMENT (OUTPATIENT)
Dept: UROLOGY | Facility: CLINIC | Age: 88
End: 2023-03-06

## 2023-04-27 ENCOUNTER — APPOINTMENT (OUTPATIENT)
Dept: UROLOGY | Facility: CLINIC | Age: 88
End: 2023-04-27

## 2023-06-19 ENCOUNTER — APPOINTMENT (OUTPATIENT)
Dept: UROLOGY | Facility: CLINIC | Age: 88
End: 2023-06-19
Payer: MEDICARE

## 2023-06-19 VITALS
SYSTOLIC BLOOD PRESSURE: 121 MMHG | WEIGHT: 130 LBS | DIASTOLIC BLOOD PRESSURE: 72 MMHG | OXYGEN SATURATION: 92 % | HEIGHT: 70 IN | HEART RATE: 64 BPM | BODY MASS INDEX: 18.61 KG/M2

## 2023-06-19 DIAGNOSIS — R31.0 GROSS HEMATURIA: ICD-10-CM

## 2023-06-19 DIAGNOSIS — N40.1 BENIGN PROSTATIC HYPERPLASIA WITH LOWER URINARY TRACT SYMPMS: ICD-10-CM

## 2023-06-19 DIAGNOSIS — R33.9 RETENTION OF URINE, UNSPECIFIED: ICD-10-CM

## 2023-06-19 DIAGNOSIS — R35.0 FREQUENCY OF MICTURITION: ICD-10-CM

## 2023-06-19 PROCEDURE — 99214 OFFICE O/P EST MOD 30 MIN: CPT

## 2023-06-19 PROCEDURE — 51798 US URINE CAPACITY MEASURE: CPT

## 2023-06-19 NOTE — PHYSICAL EXAM
[Well Groomed] : well groomed [Abdomen Soft] : soft [Abdomen Tenderness] : non-tender [FreeTextEntry1] : wheelchair bound; R foot dropw. Unable to bear weight

## 2023-06-19 NOTE — HISTORY OF PRESENT ILLNESS
[FreeTextEntry1] : 87M presents for initial evaluation of hematuria \par Referred by Dr. Pierce\par \par PMH significant for: bells palsy, BPH, nephrolithiasis, pancreatic disease \par PSH significant for: TURP \par Significant meds: tamsulosin  \par \par Seen by Dr. Pierce for LUTS and hematuria \par Renal US 2/27: Left kidney was difficult to visualized due to patient immobility, could not visualize entire kidney. Left midpole anterior simple cyst measures 2.3 cm x 2.0 cm, nonvascular. Right kidney is normal in size and echogenicity with \par out hydronephrosis. Nonobstructing echogenic focus in right midpole measuring 6.5mm. \par Started on tamsulosin \par Hematuria has been going on intermittently for several years.\par \par Patient reports having 3 UTIs in the past year \par There have been 0 culture-proven infections \par Offending bacteria include: unknown\par Acute Symptoms of: foul odor, lethargy, \par Symptoms resolve with antibiotics. Denies associated hospitalizations. \par Associated with sexual activity: n/a\par Current UTI prevention regimen: none\par \par \par Continues to have urinary frequency, urgency, subjective difficulty emptying.\par Symptoms are stable but complicated by intermittent gross hematuria with clots

## 2023-06-19 NOTE — ASSESSMENT
[FreeTextEntry1] : Mr. Preston presents for follow-up of multiple urologic complaints.  He primarily sees Dr. Pierce.  Previous notes reviewed.  Son (Kaushik) is present as an independent historian.\par \par He has a history of BPH s/p TURP with continued lower urinary tract symptoms. Currently he is most bothered by frequency, urgency, and subjective incomplete bladder emptying. This is complicated by his lack of mobility.  Patient is wheelchair-bound secondary to a right leg fracture and foot drop.  He does not bear weight, and primarily voids into a urinal.  He is currently on tamsulosin and reports some improvement with medication.\par \par Additionally, the patient suffers from gross hematuria on and off for a number of years.  Intermittently it is associated with clots.  Patient denies any urinary retention or need for catheterization.  There is no recent hematuria work-up on record.  His last cystoscopy was in 2017.  Renal ultrasound from 2022 was unremarkable.\par \par Also, the patient reports a history of recurrent urinary tract infections.  Symptoms include mental status changes and foul-smelling urine.  He denies any dysuria or worsening lower urinary tract symptoms on presentation.  Unclear if antibiotics have resolved symptoms when administered.  Postvoid residual today is 100, suggesting some level of incomplete emptying.\par \par Recommendations\par -CT urogram (most recent creatinine 0.87)\par -Cystoscopy\par -Continue tamsulosin\par -Urine culture

## 2023-06-22 ENCOUNTER — NON-APPOINTMENT (OUTPATIENT)
Age: 88
End: 2023-06-22

## 2023-06-22 LAB — BACTERIA UR CULT: NORMAL

## 2023-06-29 ENCOUNTER — APPOINTMENT (OUTPATIENT)
Dept: UROLOGY | Facility: CLINIC | Age: 88
End: 2023-06-29